# Patient Record
Sex: FEMALE | Race: WHITE | NOT HISPANIC OR LATINO | Employment: PART TIME | URBAN - METROPOLITAN AREA
[De-identification: names, ages, dates, MRNs, and addresses within clinical notes are randomized per-mention and may not be internally consistent; named-entity substitution may affect disease eponyms.]

---

## 2017-05-30 ENCOUNTER — HOSPITAL ENCOUNTER (EMERGENCY)
Facility: MEDICAL CENTER | Age: 62
End: 2017-05-31
Attending: EMERGENCY MEDICINE

## 2017-05-30 DIAGNOSIS — F10.920 ALCOHOL INTOXICATION, UNCOMPLICATED (HCC): ICD-10-CM

## 2017-05-30 DIAGNOSIS — T50.904A DRUG OVERDOSE, UNDETERMINED INTENT, INITIAL ENCOUNTER: ICD-10-CM

## 2017-05-30 PROCEDURE — 99284 EMERGENCY DEPT VISIT MOD MDM: CPT

## 2017-05-30 NOTE — ED AVS SNAPSHOT
5/31/2017    Barbara Kasper  [address] [address]    Dear Barbara:    [facility] wants to ensure your discharge home is safe and you or your loved ones have had all of your questions answered regarding your care after you leave the hospital.    Below is a list of resources and contact information should you have any questions regarding your hospital stay, follow-up instructions, or active medical symptoms.    Questions or Concerns Regarding… Contact   Medical Questions Related to Your Discharge  (7 days a week, 8am-5pm) Contact a Nurse Care Coordinator   [phone]   Medical Questions Not Related to Your Discharge  (24 hours a day / 7 days a week)  Contact the Nurse Health Line   [phone]    Medications or Discharge Instructions Refer to your discharge packet   or contact your [facility] Primary Care Provider   [phone]   Follow-up Appointment(s) Schedule your appointment via [facility]   or contact Scheduling [phone]   Billing Review your statement via [facility]  or contact Billing [phone]   Medical Records Review your records via [facility]   or contact Medical Records [phone]     You may receive a telephone call within two days of discharge. This call is to make certain you understand your discharge instructions and have the opportunity to have any questions answered. You can also easily access your medical information, test results and upcoming appointments via the [facility] free online health management tool. You can learn more and sign up at [employer]. For assistance setting up your [facility] account, please call [phone].    Once again, we want to ensure your discharge home is safe and that you have a clear understanding of any next steps in your care. If you have any questions or concerns, please do not hesitate to contact us, we are here for you. Thank you for choosing [facility] for your healthcare needs.    Sincerely,    Your [facility] Healthcare Team

## 2017-05-30 NOTE — ED AVS SNAPSHOT
MasterImage 3D Access Code: [email or web address]  Expires: 6/30/2017  5:40 AM    MasterImage 3D  A secure, online tool to manage your health information     [employer]’s MasterImage 3D® is a secure, online tool that connects you to your personalized health information from the privacy of your home -- day or night - making it very easy for you to manage your healthcare. Once the activation process is completed, you can even access your medical information using the MasterImage 3D talha, which is available for free in the Apple Talha store or Google Play store.     MasterImage 3D provides the following levels of access (as shown below):   My Chart Features   [facility] Primary Care Doctor [facility]  Specialists [facility]  Urgent  Care Non-[facility]  Primary Care  Doctor   Email your healthcare team securely and privately 24/7 X X X X   Manage appointments: schedule your next appointment; view details of past/upcoming appointments X      Request prescription refills. X      View recent personal medical records, including lab and immunizations X X X X   View health record, including health history, allergies, medications X X X X   Read reports about your outpatient visits, procedures, consult and ER notes X X X X   See your discharge summary, which is a recap of your hospital and/or ER visit that includes your diagnosis, lab results, and care plan. X X       How to register for MasterImage 3D:  1. Go to  [email or web address].  2. Click on the Sign Up Now box, which takes you to the New Member Sign Up page. You will need to provide the following information:  a. Enter your MasterImage 3D Access Code exactly as it appears at the top of this page. (You will not need to use this code after you’ve completed the sign-up process. If you do not sign up before the expiration date, you must request a new code.)   b. Enter your date of birth.   c. Enter your home email address.   d. Click Submit, and follow the next screen’s instructions.  3. Create a MasterImage 3D ID. This will be your MasterImage 3D  login ID and cannot be changed, so think of one that is secure and easy to remember.  4. Create a [employer] password. You can change your password at any time.  5. Enter your Password Reset Question and Answer. This can be used at a later time if you forget your password.   6. Enter your e-mail address. This allows you to receive e-mail notifications when new information is available in [employer].  7. Click Sign Up. You can now view your health information.    For assistance activating your [employer] account, call [phone]

## 2017-05-31 VITALS
DIASTOLIC BLOOD PRESSURE: 84 MMHG | HEART RATE: 95 BPM | HEIGHT: 65 IN | SYSTOLIC BLOOD PRESSURE: 137 MMHG | WEIGHT: 160 LBS | BODY MASS INDEX: 26.66 KG/M2 | TEMPERATURE: 98.6 F | RESPIRATION RATE: 12 BRPM

## 2017-05-31 LAB
ALBUMIN SERPL BCP-MCNC: 4.1 G/DL (ref 3.2–4.9)
ALBUMIN/GLOB SERPL: 1.3 G/DL
ALP SERPL-CCNC: 65 U/L (ref 30–99)
ALT SERPL-CCNC: 16 U/L (ref 2–50)
AMPHET UR QL SCN: NEGATIVE
ANION GAP SERPL CALC-SCNC: 8 MMOL/L (ref 0–11.9)
APAP SERPL-MCNC: <10 UG/ML (ref 10–30)
AST SERPL-CCNC: 16 U/L (ref 12–45)
BARBITURATES UR QL SCN: NEGATIVE
BASOPHILS # BLD AUTO: 1.2 % (ref 0–1.8)
BASOPHILS # BLD: 0.07 K/UL (ref 0–0.12)
BENZODIAZ UR QL SCN: NEGATIVE
BILIRUB SERPL-MCNC: 0.4 MG/DL (ref 0.1–1.5)
BUN SERPL-MCNC: 10 MG/DL (ref 8–22)
BZE UR QL SCN: NEGATIVE
CALCIUM SERPL-MCNC: 8.9 MG/DL (ref 8.5–10.5)
CANNABINOIDS UR QL SCN: NEGATIVE
CHLORIDE SERPL-SCNC: 106 MMOL/L (ref 96–112)
CO2 SERPL-SCNC: 25 MMOL/L (ref 20–33)
CREAT SERPL-MCNC: 0.76 MG/DL (ref 0.5–1.4)
EOSINOPHIL # BLD AUTO: 0.25 K/UL (ref 0–0.51)
EOSINOPHIL NFR BLD: 4.2 % (ref 0–6.9)
ERYTHROCYTE [DISTWIDTH] IN BLOOD BY AUTOMATED COUNT: 44 FL (ref 35.9–50)
ETHANOL BLD-MCNC: 0.17 G/DL
GFR SERPL CREATININE-BSD FRML MDRD: >60 ML/MIN/1.73 M 2
GLOBULIN SER CALC-MCNC: 3.1 G/DL (ref 1.9–3.5)
GLUCOSE SERPL-MCNC: 106 MG/DL (ref 65–99)
HCT VFR BLD AUTO: 43.7 % (ref 37–47)
HGB BLD-MCNC: 14.7 G/DL (ref 12–16)
IMM GRANULOCYTES # BLD AUTO: 0.01 K/UL (ref 0–0.11)
IMM GRANULOCYTES NFR BLD AUTO: 0.2 % (ref 0–0.9)
LYMPHOCYTES # BLD AUTO: 3.24 K/UL (ref 1–4.8)
LYMPHOCYTES NFR BLD: 54.7 % (ref 22–41)
MCH RBC QN AUTO: 30.5 PG (ref 27–33)
MCHC RBC AUTO-ENTMCNC: 33.6 G/DL (ref 33.6–35)
MCV RBC AUTO: 90.7 FL (ref 81.4–97.8)
MDMA UR QL SCN: NEGATIVE
METHADONE UR QL SCN: NEGATIVE
MONOCYTES # BLD AUTO: 0.47 K/UL (ref 0–0.85)
MONOCYTES NFR BLD AUTO: 7.9 % (ref 0–13.4)
NEUTROPHILS # BLD AUTO: 1.88 K/UL (ref 2–7.15)
NEUTROPHILS NFR BLD: 31.8 % (ref 44–72)
NRBC # BLD AUTO: 0 K/UL
NRBC BLD AUTO-RTO: 0 /100 WBC
OPIATES UR QL SCN: NEGATIVE
OXYCODONE UR QL SCN: NEGATIVE
PCP UR QL SCN: NEGATIVE
PLATELET # BLD AUTO: 202 K/UL (ref 164–446)
PMV BLD AUTO: 9.8 FL (ref 9–12.9)
POC BREATHALIZER: 0.03 PERCENT (ref 0–0.01)
POTASSIUM SERPL-SCNC: 3.8 MMOL/L (ref 3.6–5.5)
PROPOXYPH UR QL SCN: NEGATIVE
PROT SERPL-MCNC: 7.2 G/DL (ref 6–8.2)
RBC # BLD AUTO: 4.82 M/UL (ref 4.2–5.4)
SALICYLATES SERPL-MCNC: 0 MG/DL (ref 15–25)
SODIUM SERPL-SCNC: 139 MMOL/L (ref 135–145)
WBC # BLD AUTO: 5.9 K/UL (ref 4.8–10.8)

## 2017-05-31 PROCEDURE — 80307 DRUG TEST PRSMV CHEM ANLYZR: CPT

## 2017-05-31 PROCEDURE — 90791 PSYCH DIAGNOSTIC EVALUATION: CPT

## 2017-05-31 PROCEDURE — 80053 COMPREHEN METABOLIC PANEL: CPT

## 2017-05-31 PROCEDURE — 700102 HCHG RX REV CODE 250 W/ 637 OVERRIDE(OP): Performed by: EMERGENCY MEDICINE

## 2017-05-31 PROCEDURE — 85025 COMPLETE CBC W/AUTO DIFF WBC: CPT

## 2017-05-31 PROCEDURE — 302970 POC BREATHALIZER: Performed by: EMERGENCY MEDICINE

## 2017-05-31 PROCEDURE — A9270 NON-COVERED ITEM OR SERVICE: HCPCS | Performed by: EMERGENCY MEDICINE

## 2017-05-31 RX ORDER — IBUPROFEN 600 MG/1
600 TABLET ORAL ONCE
Status: COMPLETED | OUTPATIENT
Start: 2017-05-31 | End: 2017-05-31

## 2017-05-31 RX ADMIN — IBUPROFEN 600 MG: 600 TABLET, FILM COATED ORAL at 05:43
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
## 2017-05-31 NOTE — ED PROVIDER NOTES
"ED Provider Note    Scribed for Vaishnavi Mason M.D. by Villa Nolan. 5/31/2017, 12:32 AM.    Primary care provider: Zion Porter PA-C  Means of arrival: Ambulance  History obtained from: Patient  History limited by: None    CHIEF COMPLAINT  Chief Complaint   Patient presents with   • Drug Overdose     reported, unspecified amount of Loratadine   • Suicide Attempt     denies ideation, hx of attempts       HPI  Barbara Kasper is a 61 y.o. female who presents to the Emergency Department by ambulance for evaluation of drug overdose, Patient reports taking unknown amount of OTC allergy medication approximately around 1600 today. Patient states medication was out of date. She denies any effect from medication. Patient states she informed her sister who called EMS. Patient states she was not having a good day and took the medication to sleep. When asked if she has previous suicide attempts, patient states \"none that I would like to discuss\". Patient was placed on legal hold from [employer]. Patient has no chest pain, shortness of breath, abdominal pain, nausea, or vomiting.     REVIEW OF SYSTEMS  Pertinent positives include drug overdose, suicide attempt. Pertinent negatives include no  chest pain, shortness of breath, abdominal pain, nausea, or vomiting.  All other systems reviewed and negative.    PAST MEDICAL HISTORY   has a past medical history of Hyperlipidemia; Hypertension; Suicidal ideation; Suicidal behavior; Suicidal behavior; and Suicidal behavior with attempted self-injury.    SURGICAL HISTORY   has past surgical history that includes primary c section; abdominal hysterectomy total; and rotator cuff repair (Right).    SOCIAL HISTORY  Social History   Substance Use Topics   • Smoking status: Current Every Day Smoker -- 0.50 packs/day     Types: Cigarettes   • Smokeless tobacco: Never Used   • Alcohol Use: Yes      Comment: Socially      History   Drug Use No       FAMILY HISTORY  Family History   Problem " "Relation Age of Onset   • Stroke Mother    • Cancer Sister      Brain and leg       CURRENT MEDICATIONS  No current facility-administered medications on file prior to encounter.     Current Outpatient Prescriptions on File Prior to Encounter   Medication Sig Dispense Refill   • hydrocodone-acetaminophen (NORCO) 5-325 MG Tab per tablet Take 1-2 Tabs by mouth every four hours as needed.     • cyclobenzaprine (FLEXERIL) 10 MG Tab Take 1 Tab by mouth 3 times a day as needed. 30 Tab 1       ALLERGIES  Allergies   Allergen Reactions   • Lipitor [Atorvastatin] Rash and Itching     Took Crestor instead.       PHYSICAL EXAM  Vital Signs: /84 mmHg  Pulse 95  Temp(Src) 37 °C (98.6 °F)  Resp 12  Ht 1.651 m (5' 5\")  Wt 72.576 kg (160 lb)  BMI 26.63 kg/m2  Constitutional: Alert, no acute distress  HENT: Normocephalic, atraumatic, moist mucus membranes  Eyes: Pupils equal and reactive, normal conjunctiva, non-icteric  Neck: Supple, normal range of motion, no stridor  Cardiovascular: Regular rhythm, Normal peripheral perfusion, no cyanosis, Normal cardiac auscultation  Pulmonary: No respiratory distress, normal work of breathing, no accessory muscle usage, Clear to auscultation bilaterally  Abdomen: Soft, non tender, no peritoneal signs, bowel sounds are present.   Skin: Warm, dry, no rashes or lesions  Back: No pain with active range of motion  Musculoskeletal: Normal range of motion in all extremities, no swelling or deformity noted  Neurologic: Alert, oriented, normal motor function, no speech deficits  Psychiatric: Evasive, Poor insight. No evidence of visual or auditory hallucinations.    DIAGNOSTIC STUDIES/PROCEDURES:    LABS  Labs Reviewed   DIAGNOSTIC ALCOHOL - Abnormal; Notable for the following:     Diagnostic Alcohol 0.17 (*)     All other components within normal limits   CBC WITH DIFFERENTIAL - Abnormal; Notable for the following:     Neutrophils-Polys 31.80 (*)     Lymphocytes 54.70 (*)     Neutrophils " "(Absolute) 1.88 (*)     All other components within normal limits   COMP METABOLIC PANEL - Abnormal; Notable for the following:     Glucose 106 (*)     All other components within normal limits   SALICYLATE - Abnormal; Notable for the following:     Salicylates, Quant. 0 (*)     All other components within normal limits   POC BREATHALIZER - Abnormal; Notable for the following:     POC Breathalizer 0.03 (*)     All other components within normal limits   URINE DRUG SCREEN   ACETAMINOPHEN   ESTIMATED GFR     All labs reviewed by me.    COURSE & MEDICAL DECISION MAKING  Pertinent Labs & Imaging studies reviewed. (See chart for details)    Differential diagnoses include but are not limited to: Substance induced mood disorder, suicidal gesture, underlying psychiatric disorder    12:32 AM - Patient seen and examined at bedside. Ordered urine drug screen, blood alcohol, CBC with differential, CMP, Acetaminophen level, Salicylate level to evaluate her symptoms. Patient presents with drug overdose and possible suicide attempt following taking OTC allergy medication. Patient is refusing blood draw at this time and informed she is unable to leave at this time due to possibility of self harm. Patient was educated she will need to be medically cleared before she will speak with [facility].     Decision Making:  This is a 61 y.o. year old female who presents with reported suicidal ideation. She was brought in on a legal hold by police after ingesting what is believed to be loratadine. On my initial interview the patient is extremely evasive, will not give it time of ingestion, nor type of medication, nor amount of medication. States that she ingested the medication because she \"had a bad day \". Denies active suicidal ideation. She will not discuss any history of prior attempts, nor prior hospitalizations. She did initially refuse lab draw, is now requesting to leave. I did explain she is on a legal hold, that we cannot allow her " to leave until we can secure her safety. Her  is in the lobby, he was allowed back in attempt to help convince the patient to receive the lab work needed for medical clearance and help the patient accept life skills evaluation.    Patient did allow laboratory evaluation. Diagnostic alcohol noted to be 0.17. Her  states that she behaves abnormally at times and then several hours later will be completely normal again. Behavior may be substance induced. Tylenol and salicylate levels are negative, urine drug screen negative. Screening CBC and CMP are unremarkable.    Evaluated by life skills after attaining sobriety, at this time she endorses no thoughts of suicidal ideation or homicidal ideation is extremely cooperative. Suspect that her abnormal behavior was due to her intoxication. Throughout her stay she has shown no evidence of acute ingestion, no somnolent, no nausea or vomiting, no mental status changes. I do believe she is safe for discharge home, she is provided outpatient follow-up resources.    Discharge home in stable condition    FINAL IMPRESSION  1. Drug overdose, undetermined intent, initial encounter    2. Alcohol intoxication, uncomplicated     3. Substance-induced mood disorder     Villa LYNCH (Renatoibe), am scribing for, and in the presence of, Vaishnavi Mason M.D.    Electronically signed by: Villa Nolan (Renatoibjitendra), 5/31/2017    Vaishnavi LYNCH M.D. personally performed the services described in this documentation, as scribed by Villa Nolan in my presence, and it is both accurate and complete.    The note accurately reflects work and decisions made by me.  Vaishnavi Mason  5/31/2017  7:32 AM

## 2017-05-31 NOTE — DISCHARGE INSTRUCTIONS
Please follow-up with your primary care physician for complete recheck. Return to the emergency department if he develops any new or worsening symptoms. Please use alcohol patient resources. Return to the emergency department if you have any thoughts of self-harm or harming anyone else.      Alcohol Problems  Most adults who drink alcohol drink in moderation (not a lot) are at low risk for developing problems related to their drinking. However, all drinkers, including low-risk drinkers, should know about the health risks connected with drinking alcohol.  RECOMMENDATIONS FOR LOW-RISK DRINKING   Drink in moderation. Moderate drinking is defined as follows:   · Men - no more than 2 drinks per day.  · Nonpregnant women - no more than 1 drink per day.  · Over age 65 - no more than 1 drink per day.  A standard drink is 12 grams of pure alcohol, which is equal to a 12 ounce bottle of beer or wine cooler, a 5 ounce glass of wine, or 1.5 ounces of distilled spirits (such as whiskey, chante, vodka, or rum).   ABSTAIN FROM (DO NOT DRINK) ALCOHOL:  · When pregnant or considering pregnancy.  · When taking a medication that interacts with alcohol.  · If you are alcohol dependent.  · A medical condition that prohibits drinking alcohol (such as ulcer, liver disease, or heart disease).  DISCUSS WITH YOUR CAREGIVER:  · If you are at risk for coronary heart disease, discuss the potential benefits and risks of alcohol use: Light to moderate drinking is associated with lower rates of coronary heart disease in certain populations (for example, men over age 45 and postmenopausal women). Infrequent or nondrinkers are advised not to begin light to moderate drinking to reduce the risk of coronary heart disease so as to avoid creating an alcohol-related problem. Similar protective effects can likely be gained through proper diet and exercise.  · Women and the elderly have smaller amounts of body water than men. As a result women and the  elderly achieve a higher blood alcohol concentration after drinking the same amount of alcohol.  · Exposing a fetus to alcohol can cause a broad range of birth defects referred to as Fetal Alcohol Syndrome (FAS) or Alcohol-Related Birth Defects (ARBD). Although FAS/ARBD is connected with excessive alcohol consumption during pregnancy, studies also have reported neurobehavioral problems in infants born to mothers reporting drinking an average of 1 drink per day during pregnancy.  · Heavier drinking (the consumption of more than 4 drinks per occasion by men and more than 3 drinks per occasion by women) impairs learning (cognitive) and psychomotor functions and increases the risk of alcohol-related problems, including accidents and injuries.  CAGE QUESTIONS:   · Have you ever felt that you should Cut down on your drinking?  · Have people Annoyed you by criticizing your drinking?  · Have you ever felt bad or Guilty about your drinking?  · Have you ever had a drink first thing in the morning to steady your nerves or get rid of a hangover (Eye opener)?  If you answered positively to any of these questions: You may be at risk for alcohol-related problems if alcohol consumption is:   · Men: Greater than 14 drinks per week or more than 4 drinks per occasion.  · Women: Greater than 7 drinks per week or more than 3 drinks per occasion.  Do you or your family have a medical history of alcohol-related problems, such as:  · Blackouts.  · Sexual dysfunction.  · Depression.  · Trauma.  · Liver dysfunction.  · Sleep disorders.  · Hypertension.  · Chronic abdominal pain.  · Has your drinking ever caused you problems, such as problems with your family, problems with your work (or school) performance, or accidents/injuries?  · Do you have a compulsion to drink or a preoccupation with drinking?  · Do you have poor control or are you unable to stop drinking once you have started?  · Do you have to drink to avoid withdrawal  symptoms?  · Do you have problems with withdrawal such as tremors, nausea, sweats, or mood disturbances?  · Does it take more alcohol than in the past to get you high?  · Do you feel a strong urge to drink?  · Do you change your plans so that you can have a drink?  · Do you ever drink in the morning to relieve the shakes or a hangover?  If you have answered a number of the previous questions positively, it may be time for you to talk to your caregivers, family, and friends and see if they think you have a problem. Alcoholism is a chemical dependency that keeps getting worse and will eventually destroy your health and relationships. Many alcoholics end up dead, impoverished, or in [facility]. This is often the end result of all chemical dependency.  · Do not be discouraged if you are not ready to take action immediately.  · Decisions to change behavior often involve up and down desires to change and feeling like you cannot decide.  · Try to think more seriously about your drinking behavior.  · Think of the reasons to quit.  WHERE TO GO FOR ADDITIONAL INFORMATION   · The National [place] on Alcohol Abuse and Alcoholism (NIAAA)  www.niaaa.nih.gov  · National [place] on Alcoholism and Drug Dependence (NCADD)  www.ncadd.org  · American Society of Addiction Medicine (ASAM)  www.asam.org   Document Released: 12/18/2006 Document Revised: 03/11/2013 Document Reviewed: 08/05/2009  ExitCare® Patient Information ©2014 [employer].

## 2017-05-31 NOTE — ED NOTES
"Chief Complaint   Patient presents with   • Drug Overdose     reported, unspecified amount of Loratadine   • Suicide Attempt     denies ideation, hx of attempts     Pt BIB EMS and PD for suspected overdose and reported SI. Per EMS and PD Pt reportedly called sister and stated \"I want this all to be over, I will leave you a key\". Pt's sister was then unable to contact Pt, called PD. On arrival to scene Pt was found to have near-empty bottle of Loratadine stated she didn't know how many she took. Pt is adamantly denying suicidal ideation, states \"I'm not suicidal, I don't know why I took that many pills\". Per EMS Pt has hx of SI and attempts, and \"staged\" attempts where she has stated she overdosed but not taken any medication. Pt arrives w/ legal hold in place from PD. Pt placed on monitor, VSS, Pt is in NAD and asymptomatic on arrival. All non-essential items removed from room, Pt stripped of clothing, belongings placed in labeled bag. Pt refusing to answer most questions, yvonne and rude w/ staff, not forthcoming as to reason for taking pills or call to sister, denies everything. Pt states \"I'm going to have a panic attack\" shortly after arrival, no symptoms of such. Pt up for eval. SAD score of 7, direct observation initiated.     /84 mmHg  Pulse 95  Temp(Src) 37 °C (98.6 °F)  Resp 12  Ht 1.651 m (5' 5\")  Wt 72.576 kg (160 lb)  BMI 26.63 kg/m2    "

## 2017-05-31 NOTE — ED NOTES
Pt moved to green 30. Assumed care of pt. Pt resting comfortably on gurney. Pt with no changes from previous assessments. Respirations are even and unlabored. Bed in lowest position, sitter at bedside. Pt in direct view. Pt with no further needs at this time.

## 2017-05-31 NOTE — ED NOTES
Discharge instructions given. All questions answered. Pt verbalizing understanding. All belongings with pt. Pt ambulated to lobby.

## 2017-05-31 NOTE — ED NOTES
"Pt has been seen by ERP. Pt's  at bedside, ERP aware and ok, ERP has spoke w/  regarding Pt's situation,  to leave at this time after allowing Pt to call unknown persons on phone, security at bedside, Pt continues to be uncooperative. Pt reportedly called  stating \"they are holding me here against my will\", stating that she doesn't know why she is here despite being notified multiple times by this RN and ERP as to reason for legal hold. Lab called and at bedside for draw, Pt refusing to provide urine sample.   "

## 2021-09-29 NOTE — ED AVS SNAPSHOT
Home Care Instructions                                                                                                                Barbara Kasper   MRN: [id number]    Department:  [facility], Emergency Dept   Date of Visit:  5/30/2017            [facility], Emergency Dept    [address] [address]    Phone:  [phone]      You were seen by     Vaishnavi Mason M.D.      Your Diagnosis Was     Drug overdose, undetermined intent, initial encounter     T50.909B       Follow-up Information     1. Follow up with Zion Porter PA-C. Go in 1 day.    Specialty:  Family Medicine    Why:  For complete recheck    Contact information    [address]  [address]  [facility] [address] [phone]          2. Go to [facility], Emergency Dept.    Specialty:  Emergency Medicine    Why:  If symptoms worsen or do not continue to improve    Contact information    [address] [address] [phone]      Medication Information     Review all of your home medications and newly ordered medications with your primary doctor and/or pharmacist as soon as possible. Follow medication instructions as directed by your doctor and/or pharmacist.     Please keep your complete medication list with you and share with your physician. Update the information when medications are discontinued, doses are changed, or new medications (including over-the-counter products) are added; and carry medication information at all times in the event of emergency situations.               Medication List      ASK your doctor about these medications        Instructions    Morning Afternoon Evening Bedtime    cyclobenzaprine 10 MG Tabs   Commonly known as:  FLEXERIL        Take 1 Tab by mouth 3 times a day as needed.   Dose:  10 mg                        hydrocodone-acetaminophen 5-325 MG Tabs per tablet   Commonly known as:  NORCO        Take 1-2 Tabs by mouth  every four hours as needed.   Dose:  1-2 Tab                                Procedures and tests performed during your visit     Acetaminophen Level    Blood Alcohol    CBC WITH DIFFERENTIAL    COMP METABOLIC PANEL    ESTIMATED GFR    IP CONSULT TO     NOTIFY ADMITTING  OF SUICIDE RISK    POC BREATHALIZER    SALICYLATE LEVEL    URINE DRUG SCREEN (TRIAGE)        Discharge Instructions       Please follow-up with your primary care physician for complete recheck. Return to the emergency department if he develops any new or worsening symptoms. Please use alcohol patient resources. Return to the emergency department if you have any thoughts of self-harm or harming anyone else.      Alcohol Problems  Most adults who drink alcohol drink in moderation (not a lot) are at low risk for developing problems related to their drinking. However, all drinkers, including low-risk drinkers, should know about the health risks connected with drinking alcohol.  RECOMMENDATIONS FOR LOW-RISK DRINKING   Drink in moderation. Moderate drinking is defined as follows:   · Men - no more than 2 drinks per day.  · Nonpregnant women - no more than 1 drink per day.  · Over age 65 - no more than 1 drink per day.  A standard drink is 12 grams of pure alcohol, which is equal to a 12 ounce bottle of beer or wine cooler, a 5 ounce glass of wine, or 1.5 ounces of distilled spirits (such as whiskey, chante, vodka, or rum).   ABSTAIN FROM (DO NOT DRINK) ALCOHOL:  · When pregnant or considering pregnancy.  · When taking a medication that interacts with alcohol.  · If you are alcohol dependent.  · A medical condition that prohibits drinking alcohol (such as ulcer, liver disease, or heart disease).  DISCUSS WITH YOUR CAREGIVER:  · If you are at risk for coronary heart disease, discuss the potential benefits and risks of alcohol use: Light to moderate drinking is associated with lower rates of coronary heart disease in certain populations (for  example, men over age 45 and postmenopausal women). Infrequent or nondrinkers are advised not to begin light to moderate drinking to reduce the risk of coronary heart disease so as to avoid creating an alcohol-related problem. Similar protective effects can likely be gained through proper diet and exercise.  · Women and the elderly have smaller amounts of body water than men. As a result women and the elderly achieve a higher blood alcohol concentration after drinking the same amount of alcohol.  · Exposing a fetus to alcohol can cause a broad range of birth defects referred to as Fetal Alcohol Syndrome (FAS) or Alcohol-Related Birth Defects (ARBD). Although FAS/ARBD is connected with excessive alcohol consumption during pregnancy, studies also have reported neurobehavioral problems in infants born to mothers reporting drinking an average of 1 drink per day during pregnancy.  · Heavier drinking (the consumption of more than 4 drinks per occasion by men and more than 3 drinks per occasion by women) impairs learning (cognitive) and psychomotor functions and increases the risk of alcohol-related problems, including accidents and injuries.  CAGE QUESTIONS:   · Have you ever felt that you should Cut down on your drinking?  · Have people Annoyed you by criticizing your drinking?  · Have you ever felt bad or Guilty about your drinking?  · Have you ever had a drink first thing in the morning to steady your nerves or get rid of a hangover (Eye opener)?  If you answered positively to any of these questions: You may be at risk for alcohol-related problems if alcohol consumption is:   · Men: Greater than 14 drinks per week or more than 4 drinks per occasion.  · Women: Greater than 7 drinks per week or more than 3 drinks per occasion.  Do you or your family have a medical history of alcohol-related problems, such as:  · Blackouts.  · Sexual dysfunction.  · Depression.  · Trauma.  · Liver dysfunction.  · Sleep  disorders.  · Hypertension.  · Chronic abdominal pain.  · Has your drinking ever caused you problems, such as problems with your family, problems with your work (or school) performance, or accidents/injuries?  · Do you have a compulsion to drink or a preoccupation with drinking?  · Do you have poor control or are you unable to stop drinking once you have started?  · Do you have to drink to avoid withdrawal symptoms?  · Do you have problems with withdrawal such as tremors, nausea, sweats, or mood disturbances?  · Does it take more alcohol than in the past to get you high?  · Do you feel a strong urge to drink?  · Do you change your plans so that you can have a drink?  · Do you ever drink in the morning to relieve the shakes or a hangover?  If you have answered a number of the previous questions positively, it may be time for you to talk to your caregivers, family, and friends and see if they think you have a problem. Alcoholism is a chemical dependency that keeps getting worse and will eventually destroy your health and relationships. Many alcoholics end up dead, impoverished, or in jail. This is often the end result of all chemical dependency.  · Do not be discouraged if you are not ready to take action immediately.  · Decisions to change behavior often involve up and down desires to change and feeling like you cannot decide.  · Try to think more seriously about your drinking behavior.  · Think of the reasons to quit.  WHERE TO GO FOR ADDITIONAL INFORMATION   · The National [place] on Alcohol Abuse and Alcoholism (NIAAA)  www.niaaa.nih.gov  · National [place] on Alcoholism and Drug Dependence (NCADD)  www.ncadd.org  · American Society of Addiction Medicine (ASAM)  www.asam.org   Document Released: 12/18/2006 Document Revised: 03/11/2013 Document Reviewed: 08/05/2009  ExitCare® Patient Information ©2014 [employer].            Patient Information     Patient Information    Following emergency treatment: all  patient requiring follow-up care must return either to a private physician or a clinic if your condition worsens before you are able to obtain further medical attention, please return to the emergency room.     Billing Information    At [facility], we work to make the billing process streamlined for our patients.  Our Representatives are here to answer any questions you may have regarding your hospital bill.  If you have insurance coverage and have supplied your insurance information to us, we will submit a claim to your insurer on your behalf.  Should you have any questions regarding your bill, we can be reached online or by phone as follows:  Online: You are able pay your bills online or live chat with our representatives about any billing questions you may have. We are here to help Monday - Friday from 8:00am to 7:30pm and 9:00am - 12:00pm on Saturdays.  Please visit [email or web address]  for more information.   Phone:  [phone] or [phone]    Please note that your emergency physician, surgeon, pathologist, radiologist, anesthesiologist, and other specialists are not employed by [facility] and will therefore bill separately for their services.  Please contact them directly for any questions concerning their bills at the numbers below:     Emergency Physician Services:  [phone]  [facility]:  [phone]  [facility]:  [phone]  [facility]:  [phone]    1. Your final bill may vary from the amount quoted upon discharge if all procedures are not complete at that time, or if your doctor has additional procedures of which we are not aware. You will receive an additional bill if you return to the Emergency Department at [facility] for suture removal regardless of the facility of which the sutures were placed.     2. Please arrange for settlement of this account at the emergency registration.    3. All self-pay  accounts are due in full at the time of treatment.  If you are unable to meet this obligation then payment is expected within 4-5 days.     4. If you have had radiology studies (CT, X-ray, Ultrasound, MRI), you have received a preliminary result during your emergency department visit. Please contact the radiology department [phone] to receive a copy of your final result. Please discuss the Final result with your primary physician or with the follow up physician provided.     Crisis Hotline:  [place] Crisis Hotline:  3-612-OZQTETA or 1-100.124.1545  [place] Crisis Hotline:    1-767.412.5169 or 187-022-9516         ED Discharge Follow Up Questions    1. In order to provide you with very good care, we would like to follow up with a phone call in the next few days.  May we have your permission to contact you?     YES /  NO    2. What is the best phone number to call you? (       )_____-__________    3. What is the best time to call you?      Morning  /  Afternoon  /  Evening                   Patient Signature:  ____________________________________________________________    Date:  ____________________________________________________________                Mother